# Patient Record
Sex: FEMALE | URBAN - METROPOLITAN AREA
[De-identification: names, ages, dates, MRNs, and addresses within clinical notes are randomized per-mention and may not be internally consistent; named-entity substitution may affect disease eponyms.]

---

## 2019-10-17 ENCOUNTER — OFFICE VISIT (OUTPATIENT)
Dept: URBAN - METROPOLITAN AREA CLINIC 76 | Facility: CLINIC | Age: 69
End: 2019-10-17
Payer: MEDICARE

## 2019-10-17 DIAGNOSIS — H04.123 DRY EYE SYNDROME OF BILATERAL LACRIMAL GLANDS: ICD-10-CM

## 2019-10-17 PROCEDURE — 92002 INTRM OPH EXAM NEW PATIENT: CPT | Performed by: OPTOMETRIST

## 2019-10-17 RX ORDER — NEOMYCIN SULFATE, POLYMYXIN B SULFATE AND DEXAMETHASONE 3.5; 10000; 1 MG/ML; [USP'U]/ML; MG/ML
SUSPENSION OPHTHALMIC
Qty: 5 | Refills: 0 | Status: INACTIVE
Start: 2019-10-17 | End: 2019-10-17

## 2019-10-17 ASSESSMENT — INTRAOCULAR PRESSURE
OS: 11
OD: 11

## 2019-10-17 NOTE — IMPRESSION/PLAN
Impression: Blepharitis of lt eyelid: H01.006. OU. Plan: Discussed diagnosis in detail with patient. Warm compresses with lid massage from top / down, bottom / up, and sweep from inside / out x 2. Patient instructed to use emulsive base lubricant 3-4 x a day. Increase omega foods/nuts and/or Borage oil supplement 1500-2500mg capsule orally per day. Start Maxitrol TID OU for 10 day's. Fu 1 week appointment.

## 2019-10-25 ENCOUNTER — OFFICE VISIT (OUTPATIENT)
Dept: URBAN - METROPOLITAN AREA CLINIC 76 | Facility: CLINIC | Age: 69
End: 2019-10-25
Payer: MEDICARE

## 2019-10-25 DIAGNOSIS — H01.006 BLEPHARITIS OF LT EYELID: ICD-10-CM

## 2019-10-25 PROCEDURE — 99213 OFFICE O/P EST LOW 20 MIN: CPT | Performed by: OPTOMETRIST

## 2019-10-25 RX ORDER — FLUOROMETHOLONE 1 MG/ML
0.1 % SOLUTION/ DROPS OPHTHALMIC
Qty: 10 | Refills: 1 | Status: INACTIVE
Start: 2019-10-25 | End: 2019-10-28

## 2019-10-25 ASSESSMENT — INTRAOCULAR PRESSURE
OS: 10
OD: 11

## 2019-10-25 NOTE — IMPRESSION/PLAN
Impression: Blepharitis of lt eyelid: H01.006. OU.  Possible neomycin allergy suspect? slight improvement Plan: see plan above

## 2019-10-25 NOTE — IMPRESSION/PLAN
Impression: Dry eye syndrome of bilateral lacrimal glands: H04.123. OU. Possible neomycin allergy suspect? slight improvement Plan: Discussed diagnosis in detail with patient. Warm compresses with lid massage from top / down, bottom / up, and sweep from inside / out x 2. OK to use warm wash rag instead. Patient instructed to use emulsive base lubricant 3-4 x a day, recommend PF Soothe XP. Increase omega foods/nuts and/or Borage oil supplement 1500-2500mg capsule orally per day. D/c Maxitrol. Start FML TID OU until seen. Consider Restasis if symptoms do not improve at next visit.

## 2019-11-15 ENCOUNTER — OFFICE VISIT (OUTPATIENT)
Dept: URBAN - METROPOLITAN AREA CLINIC 76 | Facility: CLINIC | Age: 69
End: 2019-11-15
Payer: MEDICARE

## 2019-11-15 DIAGNOSIS — H10.45 OTHER CHRONIC ALLERGIC CONJUNCTIVITIS: ICD-10-CM

## 2019-11-15 DIAGNOSIS — H52.4 PRESBYOPIA: ICD-10-CM

## 2019-11-15 DIAGNOSIS — H35.3131 NONEXUDATIVE AGE-RELATED MACULAR DEGENERATION, BILATERAL, EARLY DRY STAGE: ICD-10-CM

## 2019-11-15 PROCEDURE — 99214 OFFICE O/P EST MOD 30 MIN: CPT | Performed by: OPTOMETRIST

## 2019-11-15 ASSESSMENT — INTRAOCULAR PRESSURE
OS: 11
OD: 12

## 2019-11-15 ASSESSMENT — VISUAL ACUITY
OS: 20/30+
OD: 20/25-

## 2019-11-15 NOTE — IMPRESSION/PLAN
Impression: Nonexudative age-related macular degeneration, bilateral, early dry stage: H35.3131. Plan: Discussed diagnosis in detail with patient. Counseling given about the benefits and/or risks of the Age-Related Eye Disease Study (AREDS) formulation for preventing progression of age-related macular degeneration (AMD). Add lutein 20-30 mg, zeaxanthin 2-5mg per day with MV or AREDS 2 MV PO daily as directed. Will continue to observe condition and or symptoms. Call if [address] worsens. Dispensed and explained use of Amsler grid.

## 2019-11-15 NOTE — IMPRESSION/PLAN
Impression: Other chronic allergic conjunctivitis: H10.45. Plan: Discussed diagnosis with patient. Recommend OTC oral antihistamine, and Ketotifen 1 drop bid ou, prn. Rub excess into lids. Recommend refrigerating drops. Start cool compresses 1-2 times daily.

## 2021-03-14 NOTE — IMPRESSION/PLAN
Impression: Dry eye syndrome of bilateral lacrimal glands: H04.123. OU. Improved. Plan: Discussed diagnosis in detail with patient. D/C warm compresses. Patient instructed to use emulsive base lubricant 3-4 x a day, recommend Refresh Relieva. Increase omega foods/nuts and/or Borage oil supplement 1500-2500mg capsule orally per day. Name band;